# Patient Record
Sex: FEMALE | Race: WHITE | Employment: STUDENT | URBAN - METROPOLITAN AREA
[De-identification: names, ages, dates, MRNs, and addresses within clinical notes are randomized per-mention and may not be internally consistent; named-entity substitution may affect disease eponyms.]

---
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
## 2025-07-18 ENCOUNTER — OFFICE VISIT (OUTPATIENT)
Dept: FAMILY MEDICINE CLINIC | Facility: CLINIC | Age: 20
End: 2025-07-18
Payer: COMMERCIAL

## 2025-07-18 ENCOUNTER — LAB ENCOUNTER (OUTPATIENT)
Dept: LAB | Age: 20
End: 2025-07-18
Attending: NURSE PRACTITIONER
Payer: COMMERCIAL

## 2025-07-18 VITALS
WEIGHT: 164.69 LBS | HEIGHT: 65 IN | BODY MASS INDEX: 27.44 KG/M2 | HEART RATE: 68 BPM | OXYGEN SATURATION: 98 % | TEMPERATURE: 98 F | DIASTOLIC BLOOD PRESSURE: 74 MMHG | SYSTOLIC BLOOD PRESSURE: 130 MMHG | RESPIRATION RATE: 16 BRPM

## 2025-07-18 DIAGNOSIS — J03.90 EXUDATIVE TONSILLITIS: Primary | ICD-10-CM

## 2025-07-18 DIAGNOSIS — J02.9 SORE THROAT: ICD-10-CM

## 2025-07-18 DIAGNOSIS — J03.90 EXUDATIVE TONSILLITIS: ICD-10-CM

## 2025-07-18 LAB
CONTROL LINE PRESENT WITH A CLEAR BACKGROUND (YES/NO): YES YES/NO
HETEROPH AB SER QL: NEGATIVE
KIT LOT #: NORMAL NUMERIC
STREP GRP A CUL-SCR: NEGATIVE

## 2025-07-18 PROCEDURE — 87081 CULTURE SCREEN ONLY: CPT | Performed by: NURSE PRACTITIONER

## 2025-07-18 PROCEDURE — 86665 EPSTEIN-BARR CAPSID VCA: CPT

## 2025-07-18 PROCEDURE — 3075F SYST BP GE 130 - 139MM HG: CPT | Performed by: NURSE PRACTITIONER

## 2025-07-18 PROCEDURE — 86664 EPSTEIN-BARR NUCLEAR ANTIGEN: CPT

## 2025-07-18 PROCEDURE — 86403 PARTICLE AGGLUT ANTBDY SCRN: CPT

## 2025-07-18 PROCEDURE — 3008F BODY MASS INDEX DOCD: CPT | Performed by: NURSE PRACTITIONER

## 2025-07-18 PROCEDURE — 87880 STREP A ASSAY W/OPTIC: CPT | Performed by: NURSE PRACTITIONER

## 2025-07-18 PROCEDURE — 3078F DIAST BP <80 MM HG: CPT | Performed by: NURSE PRACTITIONER

## 2025-07-18 PROCEDURE — 99213 OFFICE O/P EST LOW 20 MIN: CPT | Performed by: NURSE PRACTITIONER

## 2025-07-18 PROCEDURE — 36415 COLL VENOUS BLD VENIPUNCTURE: CPT

## 2025-07-18 NOTE — PATIENT INSTRUCTIONS
Self-Care for Sore Throats  Sore throats happen for many reasons, such as colds, allergies, cigarette smoke, air pollution, and infections caused by viruses or bacteria. In any case, your throat becomes red and sore. Your goal for self-care is to ease your discomfort while giving your throat a chance to heal.   Moisten and soothe your throat    Try a sip of water first thing after waking up.  Keep your throat moist by drinking 6 or more glasses of clear liquids every day.  Run a cool-air humidifier in your room overnight.  Stay away from cigarette smoke.   Check the air quality index if air pollution gives you a sore throat. On high pollution days, try to limit outdoor time.  Suck on throat lozenges, cough drops, hard candy, ice chips, or frozen fruit-juice bars. Use the sugar-free versions if your diet or medical condition needs them.  Gargle to ease irritation  Gargling every hour or two can ease irritation. Try gargling with one of these solutions:   1/4 teaspoon of salt in 1/2 cup of warm water  An over-the-counter anesthetic gargle  Use medicine for more relief  Over-the-counter medicine can reduce sore throat symptoms. Ask your pharmacist if you have questions about which medicine to use. To prevent possible medicine interactions, let the pharmacist know what medicines you take. To decrease symptoms:   Ease pain with anesthetic sprays. Remember, to prevent a serious condition called Reye syndrome, never give aspirin to anyone 18 or younger. Don't take aspirin if you are already taking blood thinners.   For sore throats caused by allergies, try antihistamines to block the allergic reaction.  Unless a bacterial infection is causing your sore throat, antibiotics won’t help you.   Prevent future sore throats  Try these tips to prevent a sore throat:   Stop smoking, or reduce contact with secondhand smoke. Smoke irritates the tender throat lining.  Limit contact with pets and with allergy-causing substances, such  as pollen and mold.  Wash your hands often when you’re around someone with a sore throat or cold. This will keep viruses or bacteria from spreading.  Limit outdoor time when air pollution is bad.  Don’t strain your vocal cords.  When to call your doctor  Contact your health care provider if you have:   A fever of 100.4°F (38.0°C) or higher, or as directed by your provider.  White spots on your throat.  Difficult or painful swallowing.  A skin rash.  Recent exposure to someone else with strep bacteria.  Severe hoarseness and swollen glands in the neck or jaw.  Call 911  Call 911 if:   You have trouble breathing or catching your breath.  You are drooling and have problems swallowing.  You are wheezing.  You are unable to talk.  You are dizzy or feel faint.  You have a feeling of doom.    [employer] last reviewed this educational content on 1/1/2025  This information is for informational purposes only. This is not intended to be a substitute for professional medical advice, diagnosis, or treatment. Always seek the advice and follow the directions from your physician or other qualified health care provider.  © 4565-3070 [employer]. All rights reserved. This information is not intended as a substitute for professional medical care. Always follow your healthcare professional's instructions.

## 2025-07-18 NOTE — PROGRESS NOTES
CHIEF COMPLAINT:     Chief Complaint   Patient presents with    Sore Throat     Discomfort starting on Saturday, started as a cough, tickle in throat but has worsened and there are white spots at back of throat, swollen lymph nodes, has taken advil, allergy medication, and vitamins       HPI:   Alexys English is a 19 year old female who presents to clinic with symptoms of sore throat. Patient has had for 7 days. Symptoms have been worse since onset.  Patient reports following associated symptoms: sore throat. denies stomach upset. denies rash. Patient denies strep pharyngitis or mono exposure.  Treatments tried: Advil, allergy meds, vitamins without relief.     Current Outpatient Medications   Medication Sig Dispense Refill    Budesonide-Formoterol Fumarate 80-4.5 MCG/ACT Inhalation Aerosol Inhale 2 puffs into the lungs 2 (two) times daily.      albuterol 108 (90 Base) MCG/ACT Inhalation Aero Soln Inhale 2 puffs into the lungs every 4 (four) hours as needed.        History reviewed. No pertinent past medical history.   History reviewed. No pertinent surgical history.   History reviewed. No pertinent family history.   Social History     Socioeconomic History    Marital status: Single   Tobacco Use    Smoking status: Never    Smokeless tobacco: Never     Social Drivers of Health      Received from [employer]    [facility]         REVIEW OF SYSTEMS:   GENERAL HEALTH: See HPI  SKIN: See HPI  HEENT: See HPI. Denies nasal congestion or cough. Reports hx tonsil stones.   RESPIRATORY: denies shortness of breath, or wheezing  CARDIOVASCULAR: denies chest pain, palpitations   GI: denies abdominal pain, constipation and diarrhea  NEURO: denies dizziness or lightheadedness    EXAM:   /74 (BP Location: Left arm, Patient Position: Sitting)   Pulse 68   Temp 98.2 °F (36.8 °C) (Oral)   Resp 16   Ht 5' 5\" (1.651 m)   Wt 164 lb 11.2 oz (74.7 kg)   LMP 07/07/2025   SpO2 98%   BMI 27.41 kg/m²   GENERAL: well  developed, well nourished,in no apparent distress  SKIN: no rashes,no suspicious lesions  HEAD: atraumatic, normocephalic  EYES: conjunctiva clear, EOM intact  EARS: TM's clear, non-injected, no bulging, retraction, or fluid  NOSE: nostrils patent, no exudates, nasal mucosa pink and noninflamed  THROAT: oral mucosa pink, moist. Posterior pharynx is erythematous and injected. YUNG exudate. Tonsils 2/4.  Breath is not malodorous. No uvular deviation. No drooling.  NECK: supple  LUNGS: clear to auscultation bilaterally, no wheezes or rhonchi. Breathing is non labored.  CARDIO: RRR without murmur  EXTREMITIES: no cyanosis, clubbing or edema  LYMPH: no anterior cervical. no submandibular lymphadenopathy.  No posterior cervical or occipital lymphadenopathy.    Lab review  Recent Results (from the past 24 hours)   Rapid Strep    Collection Time: 07/18/25  1:53 PM   Result Value Ref Range    Strep Grp A Screen Negative Negative    Control Line Present with a clear background (yes/no) Yes Yes/No    Kit Lot # [id number] Numeric    Kit Expiration Date 6/4/2026 Date         ASSESSMENT AND PLAN:   Alexys English is a 19 year old female who presents with sore throat symptoms that are consistent with:    ASSESSMENT:  Encounter Diagnoses   Name Primary?    Exudative tonsillitis Yes    Sore throat      Throat culture sent  Mono ordered    PLAN: Meds as below.  Comfort care instructions as listed in Patient Instructions      Risks, benefits, complications and side effects of meds discussed with patient.     OTC Tylenol/Motrin prn.   Push fluids- warm or cool liquids, whichever is soothing for patient  If antibiotics prescribed, change tooth brush after on medication for 48 hours  Warm salt water gargles 2 times per day for at least 3 days.    Do not share utensils or drinks with anyone.    The patient indicates understanding of these issues and agrees to the plan.  The patient is asked to contact their PCP in 3 days if not better or  fever greater than or equal to 100.4 persists for 72 hours. Seek immediate care if symptoms are worsening.     Patient Instructions     Self-Care for Sore Throats  Sore throats happen for many reasons, such as colds, allergies, cigarette smoke, air pollution, and infections caused by viruses or bacteria. In any case, your throat becomes red and sore. Your goal for self-care is to ease your discomfort while giving your throat a chance to heal.   Moisten and soothe your throat    Try a sip of water first thing after waking up.  Keep your throat moist by drinking 6 or more glasses of clear liquids every day.  Run a cool-air humidifier in your room overnight.  Stay away from cigarette smoke.   Check the air quality index if air pollution gives you a sore throat. On high pollution days, try to limit outdoor time.  Suck on throat lozenges, cough drops, hard candy, ice chips, or frozen fruit-juice bars. Use the sugar-free versions if your diet or medical condition needs them.  Gargle to ease irritation  Gargling every hour or two can ease irritation. Try gargling with one of these solutions:   1/4 teaspoon of salt in 1/2 cup of warm water  An over-the-counter anesthetic gargle  Use medicine for more relief  Over-the-counter medicine can reduce sore throat symptoms. Ask your pharmacist if you have questions about which medicine to use. To prevent possible medicine interactions, let the pharmacist know what medicines you take. To decrease symptoms:   Ease pain with anesthetic sprays. Remember, to prevent a serious condition called Reye syndrome, never give aspirin to anyone 18 or younger. Don't take aspirin if you are already taking blood thinners.   For sore throats caused by allergies, try antihistamines to block the allergic reaction.  Unless a bacterial infection is causing your sore throat, antibiotics won’t help you.   Prevent future sore throats  Try these tips to prevent a sore throat:   Stop smoking, or reduce  contact with secondhand smoke. Smoke irritates the tender throat lining.  Limit contact with pets and with allergy-causing substances, such as pollen and mold.  Wash your hands often when you’re around someone with a sore throat or cold. This will keep viruses or bacteria from spreading.  Limit outdoor time when air pollution is bad.  Don’t strain your vocal cords.  When to call your doctor  Contact your health care provider if you have:   A fever of 100.4°F (38.0°C) or higher, or as directed by your provider.  White spots on your throat.  Difficult or painful swallowing.  A skin rash.  Recent exposure to someone else with strep bacteria.  Severe hoarseness and swollen glands in the neck or jaw.  Call 911  Call 911 if:   You have trouble breathing or catching your breath.  You are drooling and have problems swallowing.  You are wheezing.  You are unable to talk.  You are dizzy or feel faint.  You have a feeling of doom.    [employer] last reviewed this educational content on 1/1/2025  This information is for informational purposes only. This is not intended to be a substitute for professional medical advice, diagnosis, or treatment. Always seek the advice and follow the directions from your physician or other qualified health care provider.  © 5142-9599 [employer]. All rights reserved. This information is not intended as a substitute for professional medical care. Always follow your healthcare professional's instructions.

## 2025-07-21 LAB
EBV NA IGG SER QL IA: POSITIVE
EBV VCA IGG SER QL IA: POSITIVE
EBV VCA IGM SER QL IA: NEGATIVE

## (undated) NOTE — LETTER
Date: 3/11/2024    Patient Name: Alexys English          To Whom it may concern:    This letter has been written at the patient's request. The above patient was seen at [facility] for treatment of a medical condition.    This patient should be excused from attending work/school until fever free for 24 hours with no medication and respiratory symptoms are mostly improved per patient/parent report.    The patient is expected to return to work/school on or around 3/13/24 with no limitations.        Sincerely,        YOSVANY Reza

## (undated) NOTE — MR AVS SNAPSHOT
After Visit Summary   4/29/2021    Greta Goncalves    MRN: [id number]           Visit Information     Date & Time  4/29/2021  1:30 PM Provider  [address] [facility] Dept.  Phone  [phone] [place]     LMP   06/08/20 [facility]  Monday – Friday  8:00 am – 8:00 pm   Saturday – Sunday  8:00 am – 4:00 pm    WALK-IN CARE  Primary Care Providers  Treatment for acute illness   or injury that are   non-life-threatening.   Also available by appointment